# Patient Record
Sex: FEMALE | Race: WHITE | NOT HISPANIC OR LATINO | Employment: STUDENT | URBAN - METROPOLITAN AREA
[De-identification: names, ages, dates, MRNs, and addresses within clinical notes are randomized per-mention and may not be internally consistent; named-entity substitution may affect disease eponyms.]

---

## 2018-07-06 PROBLEM — S51.859A HUMAN BITE OF FOREARM, INITIAL ENCOUNTER: Status: ACTIVE | Noted: 2018-07-06

## 2018-07-06 PROBLEM — R50.9 FEVER: Status: RESOLVED | Noted: 2018-07-06 | Resolved: 2018-07-06
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
## 2018-07-06 PROBLEM — S30.861A TICK BITE OF ABDOMEN: Status: ACTIVE | Noted: 2018-07-06

## 2018-07-06 PROBLEM — S51.859A HUMAN BITE OF FOREARM, INITIAL ENCOUNTER: Status: RESOLVED | Noted: 2018-07-06 | Resolved: 2018-07-06
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
## 2018-07-06 PROBLEM — R50.9 FEVER: Status: ACTIVE | Noted: 2018-07-06

## 2018-07-06 PROBLEM — S30.861A TICK BITE OF ABDOMEN: Status: RESOLVED | Noted: 2018-07-06 | Resolved: 2018-07-06

## 2018-07-06 PROBLEM — S83.221A PERIPHERAL TEAR OF MEDIAL MENISCUS OF RIGHT KNEE AS CURRENT INJURY: Status: ACTIVE | Noted: 2018-05-02
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
## 2018-07-18 ENCOUNTER — TELEPHONE (OUTPATIENT)
Dept: ORTHOPEDIC SURGERY | Facility: CLINIC | Age: 18
End: 2018-07-18

## 2019-01-09 PROCEDURE — 87086 URINE CULTURE/COLONY COUNT: CPT | Performed by: NURSE PRACTITIONER

## 2019-01-12 ENCOUNTER — TELEPHONE (OUTPATIENT)
Dept: URGENT CARE | Facility: CLINIC | Age: 19
End: 2019-01-12

## 2019-01-12 NOTE — TELEPHONE ENCOUNTER
Pt called [facility] returning VM; informed of negative urine cx results, and told she can stop her abx. Told pt that if symptoms persist to f/u w/ PCP. Pt verbalized understanding.    yes

## 2019-08-26 ENCOUNTER — TELEPHONE (OUTPATIENT)
Dept: URGENT CARE | Facility: CLINIC | Age: 19
End: 2019-08-26

## 2019-08-26 NOTE — TELEPHONE ENCOUNTER
----- Message from Tito Edwards MD sent at 8/26/2019  3:51 PM EDT -----  Urine culture negative.  If still having symptoms, need to follow up with PMD.

## 2019-08-26 NOTE — TELEPHONE ENCOUNTER
Left message on voicemail about negative urine culture and if she was still have symptoms to follow up with her PCP